# Patient Record
(demographics unavailable — no encounter records)

---

## 2019-12-17 NOTE — RAD REPORT
EXAM DESCRIPTION:  Homa Tineo And Bill (2 Views)12/17/2019 12:16 pm

 

CLINICAL HISTORY:  Preop for hand surgery

 

COMPARISON:  None

 

FINDINGS:   Lungs are moderately hyperaerated.

 

10 centimeter lucency is present within the left upper lobe.

 

Old rib fractures

 

The heart is normal size

 

IMPRESSION:  COPD

 

10 centimeter lucency within the left upper lobe probably a bulla.

 

Further evaluation with unenhanced CT chest is recommended

## 2019-12-18 NOTE — EKG
Test Date:    2019-12-17               Test Time:    11:49:49

Technician:   SAMEER                                    

                                                     

MEASUREMENT RESULTS:                                       

Intervals:                                           

Rate:         57                                     

MI:           174                                    

QRSD:         88                                     

QT:           378                                    

QTc:          367                                    

Axis:                                                

P:            78                                     

MI:           174                                    

QRS:          77                                     

T:            57                                     

                                                     

INTERPRETIVE STATEMENTS:                                       

                                                     

Sinus bradycardia

Possible Left atrial enlargement

Borderline ECG

No previous ECG available for comparison



Electronically Signed On 12-18-19 07:54:49 CST by Brody Gonzalez

## 2019-12-19 NOTE — XMS REPORT
[facility]

 Created on:2019



Patient:Jefferson Rangel

Sex:Male

:1965

External Reference #:[id number]





Demographics







 Address  [address] [place] [address]

 

 Phone  [phone]

 

 Preferred Language  en

 

 Marital Status  Unknown

 

 Rastafari Affiliation  Unknown

 

 Race  Unknown

 

 Ethnic Group  Unknown









Author







 Organization  [employer]









Care Team Providers







 Name  Role  Phone

 

 Jorge A May  Provider Role  Unavailable









Allergies

No Known Allergies



Problems







 Problem Type  Condition  Code  Onset Dates  Condition Status

 

 Problem  Tobacco use disorder  F17.200    Active

 

 Problem  Chronic obstructive pulmonary  J44.9    Active



   disease, unspecified COPD type      

 

 Problem  Osteoarthritis of right hand,  M19.041    Active



   unspecified osteoarthritis type      

 

 Problem  Arthritis of hand, right  M19.041    Active

 

 Problem  Primary osteoarthritis of right  M17.11    Active



   knee      

 

 Problem  Generalized anxiety disorder  F41.1    Active

 

 Problem  Chronic hepatitis C without hepatic  B18.2    Active



   coma      

 

 Problem  HTN, goal below 140/90  I10    Active

 

 Problem  Primary osteoarthritis of both  M17.0    Active



   knees      







Medications

No Known Medications



Results

No Known Results



Summary Purpose

[employer] Submission

## 2019-12-19 NOTE — XMS REPORT
[facility]

 Created on:December 10, 2019



Patient:Jefferson Rangel

Sex:Male

:1965

External Reference #:[id number]





Demographics







 Address  [address] [place] [address]

 

 Phone  [phone]

 

 Preferred Language  en

 

 Marital Status  Unknown

 

 Religion Affiliation  Unknown

 

 Race  Unknown

 

 Ethnic Group  Unknown









Author







 Organization  [employer]









Care Team Providers







 Name  Role  Phone

 

 Jian Riddle  Provider Role  Unavailable









Allergies, Adverse Reactions, Alerts







 Substance  Reaction  Event Type

 

 N.K.D.A.  Info Not Available  Non Drug Allergy







Problems







 Problem Type  Condition  Code  Onset Dates  Condition Status

 

 Problem  Tobacco use disorder  F17.200    Active

 

 Problem  Chronic obstructive pulmonary  J44.9    Active



   disease, unspecified COPD type      

 

 Assessment  Other fracture of upper and lower  S82.832D    Active



   end of left fibula, subsequent      



   encounter for closed fracture with      



   routine healing      

 

 Assessment  Pain in left ankle and joints of  M25.572    Active



   left foot      

 

 Problem  Osteoarthritis of right hand,  M19.041    Active



   unspecified osteoarthritis type      

 

 Problem  Arthritis of hand, right  M19.041    Active

 

 Problem  Primary osteoarthritis of right  M17.11    Active



   knee      

 

 Problem  Generalized anxiety disorder  F41.1    Active

 

 Problem  Chronic hepatitis C without  B18.2    Active



   hepatic coma      

 

 Problem  HTN, goal below 140/90  I10    Active

 

 Problem  Primary osteoarthritis of both  M17.0    Active



   knees      







Medications







 Medication  Code  Code  Instructions  Start  End  Status  Dosage



   System      Date  Date    

 

 Fish Oil [place]-3  NDC  36167616248  1000 MG Orally      Active  1 capsule



       Once a day        

 

 Calcipotriene  NDC  46299408128  0.005 %      Active  1 application



       Externally        to affected



       Twice a day        area

 

 Zinc  NDC  12176980648  50 MG Orally      Active  1 tablet



       Once a day        

 

 Chantix  ND  16807914167  1 MG Orally      Active  1 tablet



 Continuing Month      Twice a day        



 Peter              

 

 Fluocinolone  NDC  41186496068  0.01 %      Active  1 application



 Acetonide      Externally        to affected



       Twice a day        area

 

 Duexis  NDC  45456397800  800-26.6 MG      Active  1 tablet



       Orally Three        



       times a day        



       PRN PAIN        

 

 Chantix Starting  NDC  20369115088  0.5 MG X 11 &      Active  as directed



 Month Peter      1 MG X 42        



       Orally use as        



       directed        

 

 Ginkoba  NDC  26967103515  40 MG Orally      Active  as directed

 

 Tramadol HCl  NDC  52182221373  50 MG PO Q 6  Oct 22,    Active  1 tablet



       hrs PRN Pain  2019      

 

 Breo Ellipta  NDC  01501233185  200-25 MCG/INH      Active  1 puff



       Inhalation        



       Once a day        

 

 ProAir HFA  NDC  14902614722  108 (90 Base)      Active  2 puffs as



       MCG/ACT        needed



       Inhalation        



       every 6 hrs        

 

 Lidocaine HCl  NDC  83704-98007  2.5 %      Active  as directed



       Externally        

 

 Tumersaid  NDC  36392898800  - Orally      Active  as directed

 

 Gabapentin  NDC  87003800372  300 MG Orally      Active  1 capsule



       Once a day        

 

 Acetaminophen  NDC  [address]-53  500 MG Orally      Active  1 capsule as



       every 6 hrs        needed

 

 Tramadol HCl  NDC  31921008091  50 MG Orally      Active  1 tablet as



       Every 6 hours        needed



       as needed for        



       SEVERE PAIN        

 

 Multivitamin  NDC  91293746797  - Orally      Active  as directed



 Adults 50+              

 

 Healthy  NDC  52400993763  - Orally      Active  as directed



 Eyes/Lutein              

 

 Breo Ellipta  NDC  29344715106  200-25 MCG/INH      Active  1 puff



       Inhalation        



       Once a day        







Results

No Known Results



Summary Purpose

[employer] Submission

## 2019-12-19 NOTE — XMS REPORT
[facility]

 Created on:2019



Patient:Jefferson Rangel

Sex:Male

:1965

External Reference #:[id number]





Demographics







 Address  [address] [place] [address]

 

 Phone  [phone]

 

 Preferred Language  en

 

 Marital Status  Unknown

 

 Bahai Affiliation  Unknown

 

 Race  Unknown

 

 Ethnic Group  Unknown









Author







 Organization  [employer]









Care Team Providers







 Name  Role  Phone

 

 Lalo Jorge A  Provider Role  Unavailable









Allergies, Adverse Reactions, Alerts







 Substance  Reaction  Event Type

 

 N.K.D.A.  Info Not Available  Non Drug Allergy







Problems







 Problem Type  Condition  Code  Onset Dates  Condition Status

 

 Problem  Tobacco use disorder  F17.200    Active

 

 Problem  Chronic obstructive pulmonary  J44.9    Active



   disease, unspecified COPD type      

 

 Problem  Osteoarthritis of right hand,  M19.041    Active



   unspecified osteoarthritis type      

 

 Assessment  Tobacco use disorder  F17.200    Active

 

 Problem  Arthritis of hand, right  M19.041    Active

 

 Assessment  Hand pain, right  M79.641    Active

 

 Problem  Primary osteoarthritis of right  M17.11    Active



   knee      

 

 Problem  Generalized anxiety disorder  F41.1    Active

 

 Problem  Chronic hepatitis C without  B18.2    Active



   hepatic coma      

 

 Problem  HTN, goal below 140/90  I10    Active

 

 Problem  Primary osteoarthritis of both  M17.0    Active



   knees      

 

 Assessment  History of falling  Z91.81    Active

 

 Assessment  Osteoarthritis of right hand,  M19.041    Active



   unspecified osteoarthritis type      

 

 Assessment  Chronic hepatitis C without  B18.2    Active



   hepatic coma      

 

 Assessment  Generalized anxiety disorder  F41.1    Active

 

 Assessment  HTN, goal below 140/90  I10    Active

 

 Assessment  Primary osteoarthritis of both  M17.0    Active



   knees      

 

 Assessment  Chronic obstructive pulmonary  J44.9    Active



   disease, unspecified COPD type      

 

 Assessment  Weakness  R53.1    Active

 

 Assessment  Closed fracture of left ankle with  S82.892D    Active



   routine healing, subsequent      



   encounter      

 

 Assessment  Encounter for power mobility  Z76.89    Active



   device assessment      







Medications







 Medication  Code  Code  Instructions  Start  End  Status  Dosage



   System      Date  Date    

 

 Acetaminophen  NDC  [address]-19  500 MG Orally      Active  1 capsule as



       every 6 hrs        needed

 

 Chantix Starting  NDC  24737934405  0.5 MG X 11 &      Active  as directed



 Month Peter      1 MG X 42        



       Orally use as        



       directed        

 

 Breo Ellipta  NDC  42660906105  200-25 MCG/INH      Active  1 puff



       Inhalation        



       Once a day        

 

 Calcipotriene  NDC  42836606517  0.005 %      Active  1 application



       Externally        to affected



       Twice a day        area

 

 Duexis  NDC  63226276600  800-26.6 MG      Active  1 tablet



       Orally Three        



       times a day        



       PRN PAIN        

 

 Lidocaine HCl  NDC  71346-66076  2.5 %      Active  as directed



       Externally        

 

 Zinc  NDC  10549760809  50 MG Orally      Active  1 tablet



       Once a day        

 

 Chantix  NDC  73143461116  1 MG Orally      Active  1 tablet



 Continuing Month      Twice a day        



 Peter              

 

 Healthy  NDC  07335607233  - Orally      Active  as directed



 Eyes/Lutein              

 

 Multivitamin  NDC  25882176291  - Orally      Active  as directed



 Adults 50+              

 

 Gabapentin  NDC  51017562661  300 MG Orally      Active  1 capsule



       Once a day        

 

 Tramadol HCl  NDC  55120579702  50 MG Orally      Active  1 tablet as



       Every 6 hours        needed



       as needed for        



       SEVERE PAIN        

 

 ProAir HFA  NDC  30097820668  108 (90 Base)      Active  2 puffs as



       MCG/ACT        needed



       Inhalation        



       every 6 hrs        

 

 Fluocinolone  NDC  31535979668  0.01 %      Active  1 application



 Acetonide      Externally        to affected



       Twice a day        area

 

 Tumersaid  NDC  34054700735  - Orally      Active  as directed

 

 Ginkoba  NDC  35941476625  40 MG Orally      Active  as directed

 

 Tramadol HCl  NDC  52437327940  50 MG PO Q 6  Oct 22,    Active  1 tablet



       hrs PRN Pain  2019      

 

 Breo Ellipta  NDC  95584121206  200-25 MCG/INH      Active  1 puff



       Inhalation        



       Once a day        

 

 Fish Oil [place]-3  NDC  91939562765  1000 MG Orally      Active  1 capsule



       Once a day        







Results

No Known Results



Summary Purpose

[employer] Submission

## 2019-12-19 NOTE — XMS REPORT
Patient Summary Document

 Created on:2019



Patient:LUCINA NUNEZ

Sex:Male

:1965

External Reference #:[id number]





Demographics







 Address  [address]



   [place] [address]

 

 Home Phone  [phone]

 

 Work Phone  [phone]

 

 Email Address  [email or web address]

 

 Preferred Language  Unknown

 

 Marital Status  Unknown

 

 Muslim Affiliation  Unknown

 

 Race  Unknown

 

 Additional Race(s)  Unavailable

 

 Ethnic Group  Unknown









Author







 Organization  [facility]

 

 Address  [address] [place] [address] [address]. Colin [address] [place] [address]

 

 Phone  [phone]









Care Team Providers







 Name  Role  Phone

 

 Unavailable  Unavailable  Unavailable









Problems

This patient has no known problems.



Allergies, Adverse Reactions, Alerts

This patient has no known allergies or adverse reactions.



Medications

This patient has no known medications.

## 2019-12-19 NOTE — XMS REPORT
[facility]

 Created on:2019



Patient:Jefferson Rangel

Sex:Male

:1965

External Reference #:[id number]





Demographics







 Address  [address] [place] [address]

 

 Phone  [phone]

 

 Preferred Language  en

 

 Marital Status  Unknown

 

 Pentecostalism Affiliation  Unknown

 

 Race  Unknown

 

 Ethnic Group  Unknown









Author







 Organization  [employer]









Care Team Providers







 Name  Role  Phone

 

 Jorge A May  Provider Role  Unavailable









Allergies

No Known Allergies



Problems







 Problem Type  Condition  Code  Onset Dates  Condition Status

 

 Problem  Tobacco use disorder  F17.200    Active

 

 Problem  Chronic obstructive pulmonary  J44.9    Active



   disease, unspecified COPD type      

 

 Assessment  Arthritis of hand, right  M19.041    Active

 

 Assessment  Primary osteoarthritis of both  M17.0    Active



   knees      

 

 Problem  Osteoarthritis of right hand,  M19.041    Active



   unspecified osteoarthritis type      

 

 Problem  Arthritis of hand, right  M19.041    Active

 

 Problem  Primary osteoarthritis of right  M17.11    Active



   knee      

 

 Problem  Generalized anxiety disorder  F41.1    Active

 

 Problem  Chronic hepatitis C without hepatic  B18.2    Active



   coma      

 

 Problem  HTN, goal below 140/90  I10    Active

 

 Problem  Primary osteoarthritis of both  M17.0    Active



   knees      







Medications

No Known Medications



Results

No Known Results



Summary Purpose

[employer] Submission

## 2019-12-19 NOTE — XMS REPORT
[facility]

 Created on:2019



Patient:Jefferson Rangel

Sex:Male

:1965

External Reference #:[id number]





Demographics







 Address  [address] [place] [address]

 

 Phone  [phone]

 

 Preferred Language  en

 

 Marital Status  Unknown

 

 Yazidism Affiliation  Unknown

 

 Race  Unknown

 

 Ethnic Group  Unknown









Author







 Organization  [employer]









Care Team Providers







 Name  Role  Phone

 

 Jorge A May  Provider Role  Unavailable









Allergies

No Known Allergies



Problems







 Problem Type  Condition  Code  Onset Dates  Condition Status

 

 Problem  Tobacco use disorder  F17.200    Active

 

 Problem  Chronic obstructive pulmonary  J44.9    Active



   disease, unspecified COPD type      

 

 Problem  Osteoarthritis of right hand,  M19.041    Active



   unspecified osteoarthritis type      

 

 Problem  Arthritis of hand, right  M19.041    Active

 

 Problem  Primary osteoarthritis of right  M17.11    Active



   knee      

 

 Problem  Generalized anxiety disorder  F41.1    Active

 

 Problem  Chronic hepatitis C without hepatic  B18.2    Active



   coma      

 

 Problem  HTN, goal below 140/90  I10    Active

 

 Problem  Primary osteoarthritis of both  M17.0    Active



   knees      







Medications

No Known Medications



Results

No Known Results



Summary Purpose

[employer] Submission

## 2019-12-19 NOTE — XMS REPORT
[facility]

 Created on:2019



Patient:Jefferson Rangel

Sex:Male

:1965

External Reference #:[id number]





Demographics







 Address  [address]



   [place] [address]

 

 Phone  [phone]

 

 Preferred Language  en

 

 Marital Status  Unknown

 

 Mu-ism Affiliation  Unknown

 

 Race  Unknown

 

 Ethnic Group  Unknown









Author







 Organization  [employer]









Care Team Providers







 Name  Role  Phone

 

 Jian Riddle  Provider Role  Unavailable









Allergies, Adverse Reactions, Alerts







 Substance  Reaction  Event Type

 

 N.K.D.A.  Info Not Available  Non Drug Allergy







Problems







 Problem Type  Condition  Code  Onset Dates  Condition Status

 

 Problem  Tobacco use disorder  F17.200    Active

 

 Problem  Chronic obstructive pulmonary  J44.9    Active



   disease, unspecified COPD type      

 

 Assessment  Cellulitis of foot  L03.119    Active

 

 Assessment  Other closed fracture of distal  S82.832A    Active



   end of left fibula, initial      



   encounter      

 

 Assessment  Acute left ankle pain  M25.572    Active

 

 Problem  Osteoarthritis of right hand,  M19.041    Active



   unspecified osteoarthritis type      

 

 Problem  Arthritis of hand, right  M19.041    Active

 

 Problem  Primary osteoarthritis of right  M17.11    Active



   knee      

 

 Problem  Generalized anxiety disorder  F41.1    Active

 

 Problem  Chronic hepatitis C without  B18.2    Active



   hepatic coma      

 

 Problem  HTN, goal below 140/90  I10    Active

 

 Problem  Primary osteoarthritis of both  M17.0    Active



   knees      







Medications







 Medication  Code  Code  Instructions  Start  End  Status  Dosage



   System      Date  Date    

 

 Multivitamin  NDC  68352751738  - Orally      Active  as directed



 Adults 50+              

 

 Tramadol HCl  NDC  59022904061  50 MG Orally  Oct 16,    Active  1 tablet



       every 6 hours        



       AS NEEDED FOR        



       PAIN        

 

 Lidocaine HCl  NDC  45957-14192  2.5 %      Active  as directed



       Externally        

 

 Calcipotriene  NDC  58244841654  0.005 %      Active  1 application



       Externally        to affected



       Twice a day        area

 

 Breo Ellipta  NDC  17028389901  200-25 MCG/INH      Active  1 puff



       Inhalation        



       Once a day        

 

 Tramadol HCl  NDC  78660349556  50 MG Orally      Active  1 tablet as



       Once a day        needed

 

 Fish Oil [place]-3  NDC  86010532296  1000 MG Orally      Active  1 capsule



       Once a day        

 

 Duexis  NDC  34026314514  800-26.6 MG  Oct 07,  Nov  Active  1 tablet



       Orally Three    06,    



       times a day    2019    



       PRN PAIN        

 

 Healthy  NDC  77558005509  - Orally      Active  as directed



 Eyes/Lutein              

 

 Gabapentin  NDC  59351199906  300 MG Orally      Active  1 capsule



       Once a day        

 

 Tramadol HCl  NDC  26414153422  50 MG Orally  Oct 07,    Active  1 tablet as



       Every 6 hours  2019      needed



       as needed for        



       SEVERE PAIN        

 

 Acetaminophen  NDC  [address]-66  500 MG Orally      Active  1 capsule as



       every 6 hrs        needed

 

 Chantix  NDC  99665957758  1 MG Orally      Active  1 tablet



 Continuing Month      Twice a day        



 Peter              

 

 Bactrim DS  NDC  05095012928  800-160 MG  Oct 16,  Oct  Active  1 tablet



       Orally Twice a  2019  23,    



       day    2019    

 

 Ginkoba  NDC  60577803681  40 MG Orally      Active  as directed

 

 Chantix Starting  NDC  38316376628  0.5 MG X 11 &      Active  as directed



 Month Peter      1 MG X 42        



       Orally use as        



       directed        

 

 Tumersaid  NDC  18731996745  - Orally      Active  as directed

 

 Fluocinolone  NDC  53170960248  0.01 %      Active  1 application



 Acetonide      Externally        to affected



       Twice a day        area

 

 Zinc  NDC  01762289069  50 MG Orally      Active  1 tablet



       Once a day        

 

 ProAir HFA  NDC  71343645644  108 (90 Base)      Active  2 puffs as



       MCG/ACT        needed



       Inhalation        



       every 6 hrs        







Results

No Known Results



Summary Purpose

[employer]ClinicalWorks Submission

## 2019-12-20 NOTE — XMS REPORT
[facility]

 Created on:2019



Patient:Jefferson Rangel

Sex:Male

:1965

External Reference #:[id number]





Demographics







 Address  [address] [place] [address]

 

 Phone  [phone]

 

 Preferred Language  en

 

 Marital Status  Unknown

 

 Jehovah's witness Affiliation  Unknown

 

 Race  Unknown

 

 Ethnic Group  Unknown









Author







 Organization  [employer]









Care Team Providers







 Name  Role  Phone

 

 Lalo Jorge A  Provider Role  Unavailable









Allergies, Adverse Reactions, Alerts







 Substance  Reaction  Event Type

 

 N.K.D.A.  Info Not Available  Non Drug Allergy







Problems







 Problem Type  Condition  Code  Onset Dates  Condition Status

 

 Problem  Tobacco use disorder  F17.200    Active

 

 Problem  Chronic obstructive pulmonary  J44.9    Active



   disease, unspecified COPD type      

 

 Problem  Osteoarthritis of right hand,  M19.041    Active



   unspecified osteoarthritis type      

 

 Assessment  Tobacco use disorder  F17.200    Active

 

 Problem  Arthritis of hand, right  M19.041    Active

 

 Assessment  Hand pain, right  M79.641    Active

 

 Problem  Primary osteoarthritis of right  M17.11    Active



   knee      

 

 Problem  Generalized anxiety disorder  F41.1    Active

 

 Problem  Chronic hepatitis C without  B18.2    Active



   hepatic coma      

 

 Problem  HTN, goal below 140/90  I10    Active

 

 Problem  Primary osteoarthritis of both  M17.0    Active



   knees      

 

 Assessment  History of falling  Z91.81    Active

 

 Assessment  Osteoarthritis of right hand,  M19.041    Active



   unspecified osteoarthritis type      

 

 Assessment  Chronic hepatitis C without  B18.2    Active



   hepatic coma      

 

 Assessment  Generalized anxiety disorder  F41.1    Active

 

 Assessment  HTN, goal below 140/90  I10    Active

 

 Assessment  Primary osteoarthritis of both  M17.0    Active



   knees      

 

 Assessment  Chronic obstructive pulmonary  J44.9    Active



   disease, unspecified COPD type      

 

 Assessment  Weakness  R53.1    Active

 

 Assessment  Closed fracture of left ankle with  S82.892D    Active



   routine healing, subsequent      



   encounter      

 

 Assessment  Encounter for power mobility  Z76.89    Active



   device assessment      







Medications







 Medication  Code  Code  Instructions  Start  End  Status  Dosage



   System      Date  Date    

 

 Acetaminophen  NDC  [address]-48  500 MG Orally      Active  1 capsule as



       every 6 hrs        needed

 

 Chantix Starting  NDC  41017201260  0.5 MG X 11 &      Active  as directed



 Month Peter      1 MG X 42        



       Orally use as        



       directed        

 

 Breo Ellipta  NDC  00732297533  200-25 MCG/INH      Active  1 puff



       Inhalation        



       Once a day        

 

 Calcipotriene  NDC  56842061106  0.005 %      Active  1 application



       Externally        to affected



       Twice a day        area

 

 Duexis  NDC  67446878954  800-26.6 MG      Active  1 tablet



       Orally Three        



       times a day        



       PRN PAIN        

 

 Lidocaine HCl  NDC  96895-35152  2.5 %      Active  as directed



       Externally        

 

 Zinc  NDC  38498507520  50 MG Orally      Active  1 tablet



       Once a day        

 

 Chantix  NDC  59316953339  1 MG Orally      Active  1 tablet



 Continuing Month      Twice a day        



 Peter              

 

 Healthy  NDC  32738878743  - Orally      Active  as directed



 Eyes/Lutein              

 

 Multivitamin  NDC  48978143127  - Orally      Active  as directed



 Adults 50+              

 

 Gabapentin  NDC  32279534353  300 MG Orally      Active  1 capsule



       Once a day        

 

 Tramadol HCl  NDC  52998587236  50 MG Orally      Active  1 tablet as



       Every 6 hours        needed



       as needed for        



       SEVERE PAIN        

 

 ProAir HFA  NDC  29858121796  108 (90 Base)      Active  2 puffs as



       MCG/ACT        needed



       Inhalation        



       every 6 hrs        

 

 Fluocinolone  NDC  78858306594  0.01 %      Active  1 application



 Acetonide      Externally        to affected



       Twice a day        area

 

 Tumersaid  NDC  85774272072  - Orally      Active  as directed

 

 Ginkoba  NDC  52376923671  40 MG Orally      Active  as directed

 

 Tramadol HCl  NDC  54345640137  50 MG PO Q 6  Oct 22,    Active  1 tablet



       hrs PRN Pain  2019      

 

 Breo Ellipta  NDC  85828076840  200-25 MCG/INH      Active  1 puff



       Inhalation        



       Once a day        

 

 Fish Oil [place]-3  NDC  70410093918  1000 MG Orally      Active  1 capsule



       Once a day        







Results

No Known Results



Summary Purpose

[employer] Submission

## 2019-12-20 NOTE — OP
Surgeon:  Ward Callahan MD



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



Procedure Performed:  Excision of MCP joint replacement with Coles implant #9 
radial collateral ligament repair __________.



Procedure In Detail:  After satisfactory induction of general seizure, the 
right arm was prepped with Betadine scrub, Betadine paint, dry sterile drapes 
applied in the usual manner.  The arm was elevated exsanguinated with an 
Esmarch.  Tourniquet was inflated to 250 mmHg.  Hand placed on Rotalok table.  
Curvilinear incision was made at the middle finger metacarpal head extend 
proximally and distally.  Dissection proceeded down.  The radial side of the 
extensor mechanism was incised and advanced.  While the extensor mechanism 
retracted ulnarly, dissection proceeded down into the joint.  The patient then 
had the bone resected proximally and distally with a saw.  The tendon was 
preserved.  The bur was used to bur the canal of both the metacarpal and 
proximal phalanges and the broaches were used, number is 9 was right side.  The 
wound was irrigated with saline solution.  The 9 was placed.  Prior to placement
, K-wires were used to drill holes in the volar aspect of the proximal phalanx.
  4 Mersilene suture was passed through the bone and ligament to reattach it.  
Then, the implant was placed #9 and then the wound closed.  The periosteum was 
closed with 5-0 Vicryl .  Tourniquet released.  Electrocautery was used for 
hemostasis.  Skin was closed with 4-0 Prolene vertical mattress simple sutures 
dressed with Xeroform, 2 inch Karey Kerlix, and a splint to hold the wrist in 
10 

degrees of dorsiflexion MCP 90, PIP and DIP 0.  Patient tolerated the procedure 
well and returned to Recovery.





GH/MODL

DD:  12/19/2019 12:15:36   Voice ID:  [id number]

DT:  12/19/2019 23:38:15   Report ID:  [id number]

ARMANDO

## 2019-12-20 NOTE — XMS REPORT
[facility]

 Created on:2019



Patient:Jefferson Rangel

Sex:Male

:1965

External Reference #:[id number]





Demographics







 Address  [address] [place] [address]

 

 Phone  [phone]

 

 Preferred Language  en

 

 Marital Status  Unknown

 

 Islam Affiliation  Unknown

 

 Race  Unknown

 

 Ethnic Group  Unknown









Author







 Organization  [employer]









Care Team Providers







 Name  Role  Phone

 

 Jorge A May  Provider Role  Unavailable









Allergies

No Known Allergies



Problems







 Problem Type  Condition  Code  Onset Dates  Condition Status

 

 Problem  Tobacco use disorder  F17.200    Active

 

 Problem  Chronic obstructive pulmonary  J44.9    Active



   disease, unspecified COPD type      

 

 Problem  Osteoarthritis of right hand,  M19.041    Active



   unspecified osteoarthritis type      

 

 Problem  Arthritis of hand, right  M19.041    Active

 

 Problem  Primary osteoarthritis of right  M17.11    Active



   knee      

 

 Problem  Generalized anxiety disorder  F41.1    Active

 

 Problem  Chronic hepatitis C without hepatic  B18.2    Active



   coma      

 

 Problem  HTN, goal below 140/90  I10    Active

 

 Problem  Primary osteoarthritis of both  M17.0    Active



   knees      







Medications

No Known Medications



Results

No Known Results



Summary Purpose

[employer] Submission

## 2019-12-20 NOTE — XMS REPORT
[facility]

 Created on:2019



Patient:Jefferson Rangel

Sex:Male

:1965

External Reference #:[id number]





Demographics







 Address  [address] [place] [address]

 

 Phone  [phone]

 

 Preferred Language  en

 

 Marital Status  Unknown

 

 Adventist Affiliation  Unknown

 

 Race  Unknown

 

 Ethnic Group  Unknown









Author







 Organization  [employer]









Care Team Providers







 Name  Role  Phone

 

 Jorge A May  Provider Role  Unavailable









Allergies

No Known Allergies



Problems







 Problem Type  Condition  Code  Onset Dates  Condition Status

 

 Problem  Tobacco use disorder  F17.200    Active

 

 Problem  Chronic obstructive pulmonary  J44.9    Active



   disease, unspecified COPD type      

 

 Assessment  Arthritis of hand, right  M19.041    Active

 

 Assessment  Primary osteoarthritis of both  M17.0    Active



   knees      

 

 Problem  Osteoarthritis of right hand,  M19.041    Active



   unspecified osteoarthritis type      

 

 Problem  Arthritis of hand, right  M19.041    Active

 

 Problem  Primary osteoarthritis of right  M17.11    Active



   knee      

 

 Problem  Generalized anxiety disorder  F41.1    Active

 

 Problem  Chronic hepatitis C without hepatic  B18.2    Active



   coma      

 

 Problem  HTN, goal below 140/90  I10    Active

 

 Problem  Primary osteoarthritis of both  M17.0    Active



   knees      







Medications

No Known Medications



Results

No Known Results



Summary Purpose

[employer] Submission

## 2019-12-20 NOTE — XMS REPORT
[facility]

 Created on:2019



Patient:Jefferson Rangel

Sex:Male

:1965

External Reference #:[id number]





Demographics







 Address  [address] [place] [address]

 

 Phone  [phone]

 

 Preferred Language  en

 

 Marital Status  Unknown

 

 Mandaen Affiliation  Unknown

 

 Race  Unknown

 

 Ethnic Group  Unknown









Author







 Organization  [employer]









Care Team Providers







 Name  Role  Phone

 

 Jian Riddle  Provider Role  Unavailable









Allergies, Adverse Reactions, Alerts







 Substance  Reaction  Event Type

 

 N.K.D.A.  Info Not Available  Non Drug Allergy







Problems







 Problem Type  Condition  Code  Onset Dates  Condition Status

 

 Problem  Tobacco use disorder  F17.200    Active

 

 Problem  Chronic obstructive pulmonary  J44.9    Active



   disease, unspecified COPD type      

 

 Assessment  Other fracture of upper and lower  S82.832D    Active



   end of left fibula, subsequent      



   encounter for closed fracture with      



   routine healing      

 

 Assessment  Cellulitis of foot  L03.119    Active

 

 Assessment  Acute left ankle pain  M25.572    Active

 

 Problem  Osteoarthritis of right hand,  M19.041    Active



   unspecified osteoarthritis type      

 

 Problem  Arthritis of hand, right  M19.041    Active

 

 Problem  Primary osteoarthritis of right  M17.11    Active



   knee      

 

 Problem  Generalized anxiety disorder  F41.1    Active

 

 Problem  Chronic hepatitis C without  B18.2    Active



   hepatic coma      

 

 Problem  HTN, goal below 140/90  I10    Active

 

 Problem  Primary osteoarthritis of both  M17.0    Active



   knees      







Medications







 Medication  Code  Code  Instructions  Start  End  Status  Dosage



   System      Date  Date    

 

 Chantix Starting  NDC  90363991742  0.5 MG X 11 &      Active  as directed



 Month Peter      1 MG X 42        



       Orally use as        



       directed        

 

 ProAir HFA  NDC  36320480665  108 (90 Base)      Active  2 puffs as



       MCG/ACT        needed



       Inhalation        



       every 6 hrs        

 

 Chantix  NDC  27248449909  1 MG Orally      Active  1 tablet



 Continuing Month      Twice a day        



 Peter              

 

 Duexis  NDC  64624637194  800-26.6 MG  Oct 07,  Nov  Active  1 tablet



       Orally Three    06,    



       times a day    2019    



       PRN PAIN        

 

 Ginkoba  NDC  05869595524  40 MG Orally      Active  as directed

 

 Fluocinolone  NDC  39979749609  0.01 %      Active  1 application



 Acetonide      Externally        to affected



       Twice a day        area

 

 Multivitamin  NDC  23324197124  - Orally      Active  as directed



 Adults 50+              

 

 Zinc  NDC  66267222840  50 MG Orally      Active  1 tablet



       Once a day        

 

 Acetaminophen  NDC  [address]-24  500 MG Orally      Active  1 capsule as



       every 6 hrs        needed

 

 Tumersaid  NDC  13716729629  - Orally      Active  as directed

 

 Breo Ellipta  NDC  65771543876  200-25 MCG/INH      Active  1 puff



       Inhalation        



       Once a day        

 

 Tramadol HCl  NDC  96039177795  50 MG PO Q 6  Oct 22,    Active  1 tablet



       hrs PRN Pain        

 

 Fish Oil [place]-3  NDC  51658153522  1000 MG Orally      Active  1 capsule



       Once a day        

 

 Lidocaine HCl  NDC  41441-06760  2.5 %      Active  as directed



       Externally        

 

 Calcipotriene  NDC  04607920645  0.005 %      Active  1 application



       Externally        to affected



       Twice a day        area

 

 Healthy  NDC  37780539600  - Orally      Active  as directed



 Eyes/Lutein              

 

 Gabapentin  NDC  50304487807  300 MG Orally      Active  1 capsule



       Once a day        







Results

No Known Results



Summary Purpose

[employer]Works Submission

## 2019-12-20 NOTE — XMS REPORT
[facility]

 Created on:2019



Patient:Jefferson Rangel

Sex:Male

:1965

External Reference #:[id number]





Demographics







 Address  [address]



   [place] [address]

 

 Phone  [phone]

 

 Preferred Language  en

 

 Marital Status  Unknown

 

 Catholic Affiliation  Unknown

 

 Race  Unknown

 

 Ethnic Group  Unknown









Author







 Organization  [employer]









Care Team Providers







 Name  Role  Phone

 

 Jian Riddle  Provider Role  Unavailable









Allergies, Adverse Reactions, Alerts







 Substance  Reaction  Event Type

 

 N.K.D.A.  Info Not Available  Non Drug Allergy







Problems







 Problem Type  Condition  Code  Onset Dates  Condition Status

 

 Problem  Tobacco use disorder  F17.200    Active

 

 Problem  Chronic obstructive pulmonary  J44.9    Active



   disease, unspecified COPD type      

 

 Assessment  Cellulitis of foot  L03.119    Active

 

 Assessment  Other closed fracture of distal  S82.832A    Active



   end of left fibula, initial      



   encounter      

 

 Assessment  Acute left ankle pain  M25.572    Active

 

 Problem  Osteoarthritis of right hand,  M19.041    Active



   unspecified osteoarthritis type      

 

 Problem  Arthritis of hand, right  M19.041    Active

 

 Problem  Primary osteoarthritis of right  M17.11    Active



   knee      

 

 Problem  Generalized anxiety disorder  F41.1    Active

 

 Problem  Chronic hepatitis C without  B18.2    Active



   hepatic coma      

 

 Problem  HTN, goal below 140/90  I10    Active

 

 Problem  Primary osteoarthritis of both  M17.0    Active



   knees      







Medications







 Medication  Code  Code  Instructions  Start  End  Status  Dosage



   System      Date  Date    

 

 Multivitamin  NDC  30590825386  - Orally      Active  as directed



 Adults 50+              

 

 Tramadol HCl  NDC  68855863966  50 MG Orally  Oct 16,    Active  1 tablet



       every 6 hours        



       AS NEEDED FOR        



       PAIN        

 

 Lidocaine HCl  NDC  04914-12047  2.5 %      Active  as directed



       Externally        

 

 Calcipotriene  NDC  79914468015  0.005 %      Active  1 application



       Externally        to affected



       Twice a day        area

 

 Breo Ellipta  NDC  27487828606  200-25 MCG/INH      Active  1 puff



       Inhalation        



       Once a day        

 

 Tramadol HCl  NDC  13820624044  50 MG Orally      Active  1 tablet as



       Once a day        needed

 

 Fish Oil [place]-3  NDC  07021100274  1000 MG Orally      Active  1 capsule



       Once a day        

 

 Duexis  NDC  01047233224  800-26.6 MG  Oct 07,  Nov  Active  1 tablet



       Orally Three    06,    



       times a day    2019    



       PRN PAIN        

 

 Healthy  NDC  34921312525  - Orally      Active  as directed



 Eyes/Lutein              

 

 Gabapentin  NDC  77440266999  300 MG Orally      Active  1 capsule



       Once a day        

 

 Tramadol HCl  NDC  89298763029  50 MG Orally  Oct 07,    Active  1 tablet as



       Every 6 hours  2019      needed



       as needed for        



       SEVERE PAIN        

 

 Acetaminophen  NDC  [address]-98  500 MG Orally      Active  1 capsule as



       every 6 hrs        needed

 

 Chantix  NDC  23610131471  1 MG Orally      Active  1 tablet



 Continuing Month      Twice a day        



 Peter              

 

 Bactrim DS  NDC  63962919413  800-160 MG  Oct 16,  Oct  Active  1 tablet



       Orally Twice a  2019  23,    



       day    2019    

 

 Ginkoba  NDC  51062941345  40 MG Orally      Active  as directed

 

 Chantix Starting  NDC  22554537477  0.5 MG X 11 &      Active  as directed



 Month Peter      1 MG X 42        



       Orally use as        



       directed        

 

 Tumersaid  NDC  15932676765  - Orally      Active  as directed

 

 Fluocinolone  NDC  86887377140  0.01 %      Active  1 application



 Acetonide      Externally        to affected



       Twice a day        area

 

 Zinc  NDC  56379465980  50 MG Orally      Active  1 tablet



       Once a day        

 

 ProAir HFA  NDC  49915103583  108 (90 Base)      Active  2 puffs as



       MCG/ACT        needed



       Inhalation        



       every 6 hrs        







Results

No Known Results



Summary Purpose

[employer]ClinicalWorks Submission

## 2019-12-20 NOTE — RAD REPORT
EXAM DESCRIPTION:  RAD - Hand Right 3 View - 12/20/2019 6:17 pm

 

CLINICAL HISTORY:  Right hand pain

 

FINDINGS:  A spacer has been placed into the third MCP joint. Resection of portions of the distal thi
rd metacarpal and proximal third phalanx are present.

 

No dislocation. No acute fracture

## 2019-12-20 NOTE — XMS REPORT
[facility]

 Created on:December 10, 2019



Patient:Jefferson Rangel

Sex:Male

:1965

External Reference #:[id number]





Demographics







 Address  [address] [place] [address]

 

 Phone  [phone]

 

 Preferred Language  en

 

 Marital Status  Unknown

 

 Nondenominational Affiliation  Unknown

 

 Race  Unknown

 

 Ethnic Group  Unknown









Author







 Organization  [employer]









Care Team Providers







 Name  Role  Phone

 

 Jian Riddle  Provider Role  Unavailable









Allergies, Adverse Reactions, Alerts







 Substance  Reaction  Event Type

 

 N.K.D.A.  Info Not Available  Non Drug Allergy







Problems







 Problem Type  Condition  Code  Onset Dates  Condition Status

 

 Problem  Tobacco use disorder  F17.200    Active

 

 Problem  Chronic obstructive pulmonary  J44.9    Active



   disease, unspecified COPD type      

 

 Assessment  Other fracture of upper and lower  S82.832D    Active



   end of left fibula, subsequent      



   encounter for closed fracture with      



   routine healing      

 

 Assessment  Pain in left ankle and joints of  M25.572    Active



   left foot      

 

 Problem  Osteoarthritis of right hand,  M19.041    Active



   unspecified osteoarthritis type      

 

 Problem  Arthritis of hand, right  M19.041    Active

 

 Problem  Primary osteoarthritis of right  M17.11    Active



   knee      

 

 Problem  Generalized anxiety disorder  F41.1    Active

 

 Problem  Chronic hepatitis C without  B18.2    Active



   hepatic coma      

 

 Problem  HTN, goal below 140/90  I10    Active

 

 Problem  Primary osteoarthritis of both  M17.0    Active



   knees      







Medications







 Medication  Code  Code  Instructions  Start  End  Status  Dosage



   System      Date  Date    

 

 Fish Oil [place]-3  NDC  34098394820  1000 MG Orally      Active  1 capsule



       Once a day        

 

 Calcipotriene  NDC  64555198574  0.005 %      Active  1 application



       Externally        to affected



       Twice a day        area

 

 Zinc  NDC  46037921710  50 MG Orally      Active  1 tablet



       Once a day        

 

 Chantix  ND  64619342062  1 MG Orally      Active  1 tablet



 Continuing Month      Twice a day        



 Peter              

 

 Fluocinolone  NDC  16531469995  0.01 %      Active  1 application



 Acetonide      Externally        to affected



       Twice a day        area

 

 Duexis  NDC  02224515656  800-26.6 MG      Active  1 tablet



       Orally Three        



       times a day        



       PRN PAIN        

 

 Chantix Starting  NDC  79327237119  0.5 MG X 11 &      Active  as directed



 Month Peter      1 MG X 42        



       Orally use as        



       directed        

 

 Ginkoba  NDC  65267592758  40 MG Orally      Active  as directed

 

 Tramadol HCl  NDC  55278069357  50 MG PO Q 6  Oct 22,    Active  1 tablet



       hrs PRN Pain  2019      

 

 Breo Ellipta  NDC  74395968912  200-25 MCG/INH      Active  1 puff



       Inhalation        



       Once a day        

 

 ProAir HFA  NDC  70716814106  108 (90 Base)      Active  2 puffs as



       MCG/ACT        needed



       Inhalation        



       every 6 hrs        

 

 Lidocaine HCl  NDC  55981-0[address]  2.5 %      Active  as directed



       Externally        

 

 Tumersaid  NDC  03961613981  - Orally      Active  as directed

 

 Gabapentin  NDC  82935931441  300 MG Orally      Active  1 capsule



       Once a day        

 

 Acetaminophen  NDC  [address]-80  500 MG Orally      Active  1 capsule as



       every 6 hrs        needed

 

 Tramadol HCl  NDC  58042503410  50 MG Orally      Active  1 tablet as



       Every 6 hours        needed



       as needed for        



       SEVERE PAIN        

 

 Multivitamin  NDC  39434126514  - Orally      Active  as directed



 Adults 50+              

 

 Healthy  NDC  52738472141  - Orally      Active  as directed



 Eyes/Lutein              

 

 Breo Ellipta  NDC  14616803507  200-25 MCG/INH      Active  1 puff



       Inhalation        



       Once a day        







Results

No Known Results



Summary Purpose

eClinicalWorks Submission

## 2019-12-20 NOTE — XMS REPORT
Patient Summary Document

 Created on:2019



Patient:LUCINA NUNEZ

Sex:Male

:1965

External Reference #:[id number]





Demographics







 Address  [address]



   [place] [address]

 

 Home Phone  [phone]

 

 Work Phone  [phone]

 

 Email Address  [email or web address]

 

 Preferred Language  Unknown

 

 Marital Status  Unknown

 

 Scientologist Affiliation  Unknown

 

 Race  Unknown

 

 Additional Race(s)  Unavailable

 

 Ethnic Group  Unknown









Author







 Organization  [facility]

 

 Address  [address] [place] [address] [address]. [address] [place] [address]

 

 Phone  [phone]









Care Team Providers







 Name  Role  Phone

 

 Unavailable  Unavailable  Unavailable









Problems

This patient has no known problems.



Allergies, Adverse Reactions, Alerts

This patient has no known allergies or adverse reactions.



Medications

This patient has no known medications.

## 2019-12-20 NOTE — XMS REPORT
[facility]

 Created on:2019



Patient:Jefferson Rangel

Sex:Male

:1965

External Reference #:[id number]





Demographics







 Address  [address] [place] [address]

 

 Phone  [phone]

 

 Preferred Language  en

 

 Marital Status  Unknown

 

 Jainism Affiliation  Unknown

 

 Race  Unknown

 

 Ethnic Group  Unknown









Author







 Organization  [employer]









Care Team Providers







 Name  Role  Phone

 

 Jorge A May  Provider Role  Unavailable









Allergies

No Known Allergies



Problems







 Problem Type  Condition  Code  Onset Dates  Condition Status

 

 Problem  Tobacco use disorder  F17.200    Active

 

 Problem  Chronic obstructive pulmonary  J44.9    Active



   disease, unspecified COPD type      

 

 Problem  Osteoarthritis of right hand,  M19.041    Active



   unspecified osteoarthritis type      

 

 Problem  Arthritis of hand, right  M19.041    Active

 

 Problem  Primary osteoarthritis of right  M17.11    Active



   knee      

 

 Problem  Generalized anxiety disorder  F41.1    Active

 

 Problem  Chronic hepatitis C without hepatic  B18.2    Active



   coma      

 

 Problem  HTN, goal below 140/90  I10    Active

 

 Problem  Primary osteoarthritis of both  M17.0    Active



   knees      







Medications

No Known Medications



Results

No Known Results



Summary Purpose

[employer] Submission

## 2019-12-20 NOTE — ER
Nurse's Notes                                                                                     

 [facility]                                                                 

Name: Jefferson Mendes                                                                            

Age: 54 yrs                                                                                       

Sex: Male                                                                                         

: 1965                                                                                   

MRN: [id number]                                                                                   

Arrival Date: 2019                                                                          

Time: 17:44                                                                                       

Account#: [id number]                                                                            

Bed 20                                                                                            

Private MD:                                                                                       

Diagnosis: Pain in right hand;Encounter for change or removal of surgical wound dressing          

                                                                                                  

Presentation:                                                                                     

                                                                                             

18:01 Presenting complaint: Patient states: had joint replacement in right hand yesterday by  iw  

      Dr. Callahan, is having pain and swelling, bleeding to right hand. Transition of care:     

      patient was not received from another setting of care. Onset of symptoms was 2019. Risk Assessment: Do you want to hurt yourself or someone else? Patient            

      reports no desire to harm self or others. Initial Sepsis Screen: Does the patient meet      

      any 2 criteria? No. Patient's initial sepsis screen is negative. Does the patient have      

      a suspected source of infection? No. Patient's initial sepsis screen is negative.           

18:01 Method Of Arrival: Ambulatory                                                           iw  

18:01 Acuity: CASEY 3                                                                           iw  

                                                                                                  

Historical:                                                                                       

- Allergies:                                                                                      

18:04 No Known Allergies;                                                                     iw  

- Home Meds:                                                                                      

18:04 gabapentin oral oral [Active];                                                          iw  

- PMHx:                                                                                           

18:04 Arthritis;                                                                              iw  

- PSHx:                                                                                           

18:04 neck; Knee surgery; feet; ankle; Tonsillectomy;                                         iw  

                                                                                                  

- Family history:: not pertinent.                                                                 

- Hospitalizations: : No recent hospitalization is reported.                                      

                                                                                                  

                                                                                                  

Screenin:10 Abuse screen: Denies threats or abuse. Nutritional screening: No deficits noted.        em  

      Tuberculosis screening: No symptoms or risk factors identified. Fall Risk None              

      identified.                                                                                 

                                                                                                  

Assessment:                                                                                       

18:10 General: Appears in no apparent distress. comfortable, Behavior is calm, cooperative,   em  

      Denies fever. Pain: Complains of pain in right hand Pain currently is 8 out of 10 on a      

      pain scale. Neuro: Level of Consciousness is awake, alert, obeys commands, Oriented to      

      person, place, time, situation, Appropriate for age. Cardiovascular: Capillary refill <     

      3 seconds Patient's skin is warm and dry. Respiratory: Airway is patent Respiratory         

      effort is even, unlabored, Respiratory pattern is regular, symmetrical. Derm: Skin is       

      intact, is healthy with good turgor, Skin is pink, warm \T\ dry. Wound noted right hand     

      Other: had surgery yesterday and was concerned for the bleeding and swelling, reports       

      hand felt warm, no bloody drainage noted coming from wound. Musculoskeletal: Capillary      

      refill < 3 seconds, Range of motion: intact in all extremities.                             

                                                                                                  

Vital Signs:                                                                                      

18:04  / 89; Pulse 72; Resp 16; Pulse Ox 98% on R/A; Weight 71.21 kg; Height 5 ft. 10   iw  

      in. (177.80 cm); Pain 8/10;                                                                 

18:04 Body Mass Index 22.53 (71.21 kg, 177.80 cm)                                               

                                                                                                  

ED Course:                                                                                        

17:44 Patient arrived in ED.                                                                  mr  

17:57 Molina, Gurjit, LVN is Primary Nurse.                                                     em  

18:00 Gallo Gustafson MD is Attending Physician.                                                rn  

18:02 Triage completed.                                                                       iw  

18:04 Arm band placed on.                                                                     iw  

18:10 Patient has correct armband on for positive identification. Bed in low position. Call   em  

      light in reach. Side rails up X2. Pulse ox on. NIBP on.                                     

18:17 XRAY Hand RIGHT 3 View In Process Unspecified.                                          EDMS

18:30 Sven Ceballos PA is PHCP.                                                              [facility] 

18:30 Initial lab(s) drawn, by me, sent to lab. Inserted saline lock: 22 gauge in left        em  

      forearm, using aseptic technique. Blood collected.                                          

18:54 Ward Callahan MD is Referral Physician.                                              [facility] 

                                                                                                  

Administered Medications:                                                                         

19:07 Drug: Bactrim (160 mg-800 mg (DS) 1 tablet Route: PO;                                   iw  

19:08 Follow up: Response: Medication administered at discharge.                              em  

                                                                                                  

                                                                                                  

Outcome:                                                                                          

18:54 Discharge ordered by MD.                                                                dwayne 

19:12 Patient left the ED.                                                                      

                                                                                                  

Signatures:                                                                                       

Dispatcher [employer]                           EDMS                                                 

Sven Ceballos PA PA jmm                                                  

OrestesMarialuisa                                 mr MolinaGurjit, LINDAN                       LVN  em                                                   

Sonya Sandoval, KAVYA                     RN                                                      

Gallo Gustafson MD MD   rn                                                   

                                                                                                  

Corrections: (The following items were deleted from the chart)                                    

18:04 18:01 Acuity: CASEY 4 [facility]  

                                                                                                  

**************************************************************************************************

## 2024-09-22 NOTE — XMS REPORT
[facility]

 Created on:2019



Patient:Jefferson Rangel

Sex:Male

:1965

External Reference #:[id number]





Demographics







 Address  [address] [place] [address]

 

 Phone  [phone]

 

 Preferred Language  en

 

 Marital Status  Unknown

 

 Druze Affiliation  Unknown

 

 Race  Unknown

 

 Ethnic Group  Unknown









Author







 Organization  [employer]









Care Team Providers







 Name  Role  Phone

 

 Jian Riddle  Provider Role  Unavailable









Allergies, Adverse Reactions, Alerts







 Substance  Reaction  Event Type

 

 N.K.D.A.  Info Not Available  Non Drug Allergy







Problems







 Problem Type  Condition  Code  Onset Dates  Condition Status

 

 Problem  Tobacco use disorder  F17.200    Active

 

 Problem  Chronic obstructive pulmonary  J44.9    Active



   disease, unspecified COPD type      

 

 Assessment  Other fracture of upper and lower  S82.832D    Active



   end of left fibula, subsequent      



   encounter for closed fracture with      



   routine healing      

 

 Assessment  Cellulitis of foot  L03.119    Active

 

 Assessment  Acute left ankle pain  M25.572    Active

 

 Problem  Osteoarthritis of right hand,  M19.041    Active



   unspecified osteoarthritis type      

 

 Problem  Arthritis of hand, right  M19.041    Active

 

 Problem  Primary osteoarthritis of right  M17.11    Active



   knee      

 

 Problem  Generalized anxiety disorder  F41.1    Active

 

 Problem  Chronic hepatitis C without  B18.2    Active



   hepatic coma      

 

 Problem  HTN, goal below 140/90  I10    Active

 

 Problem  Primary osteoarthritis of both  M17.0    Active



   knees      







Medications







 Medication  Code  Code  Instructions  Start  End  Status  Dosage



   System      Date  Date    

 

 Chantix Starting  NDC  84801587393  0.5 MG X 11 &      Active  as directed



 Month Peter      1 MG X 42        



       Orally use as        



       directed        

 

 ProAir HFA  NDC  81547867279  108 (90 Base)      Active  2 puffs as



       MCG/ACT        needed



       Inhalation        



       every 6 hrs        

 

 Chantix  NDC  38709097113  1 MG Orally      Active  1 tablet



 Continuing Month      Twice a day        



 Peter              

 

 Duexis  NDC  46938379802  800-26.6 MG  Oct 07,  Nov  Active  1 tablet



       Orally Three    06,    



       times a day    2019    



       PRN PAIN        

 

 Ginkoba  NDC  49552269765  40 MG Orally      Active  as directed

 

 Fluocinolone  NDC  75284927606  0.01 %      Active  1 application



 Acetonide      Externally        to affected



       Twice a day        area

 

 Multivitamin  NDC  23636060076  - Orally      Active  as directed



 Adults 50+              

 

 Zinc  NDC  72652871482  50 MG Orally      Active  1 tablet



       Once a day        

 

 Acetaminophen  NDC  [address]-79  500 MG Orally      Active  1 capsule as



       every 6 hrs        needed

 

 Tumersaid  NDC  50982050020  - Orally      Active  as directed

 

 Breo Ellipta  NDC  48968977812  200-25 MCG/INH      Active  1 puff



       Inhalation        



       Once a day        

 

 Tramadol HCl  NDC  85704766888  50 MG PO Q 6  Oct 22,    Active  1 tablet



       hrs PRN Pain        

 

 Fish Oil [place]-3  NDC  09234352283  1000 MG Orally      Active  1 capsule



       Once a day        

 

 Lidocaine HCl  NDC  92[address]  2.5 %      Active  as directed



       Externally        

 

 Calcipotriene  NDC  23901388963  0.005 %      Active  1 application



       Externally        to affected



       Twice a day        area

 

 Healthy  NDC  74285035590  - Orally      Active  as directed



 Eyes/Lutein              

 

 Gabapentin  NDC  92967881550  300 MG Orally      Active  1 capsule



       Once a day        







Results

No Known Results



Summary Purpose

[employer]ClinicalWorks Submission Pt remains on the medical floor waiting for psychiatric placement. Referral sent to  [facility] and spoke with Transfer Center at [phone]. Referral package faxed ([phone]) and will be reviewed for admission.  As per transfer team, bed is available however pt needs to be accepted and insurance authorization needs to be obtained. SW to provide updates.